# Patient Record
Sex: FEMALE | Race: WHITE
[De-identification: names, ages, dates, MRNs, and addresses within clinical notes are randomized per-mention and may not be internally consistent; named-entity substitution may affect disease eponyms.]

---

## 2017-12-31 ENCOUNTER — HOSPITAL ENCOUNTER (EMERGENCY)
Dept: HOSPITAL 25 - UCCORT | Age: 57
Discharge: HOME | End: 2017-12-31
Payer: COMMERCIAL

## 2017-12-31 VITALS — DIASTOLIC BLOOD PRESSURE: 95 MMHG | SYSTOLIC BLOOD PRESSURE: 152 MMHG

## 2017-12-31 DIAGNOSIS — R30.0: Primary | ICD-10-CM

## 2017-12-31 DIAGNOSIS — Z88.2: ICD-10-CM

## 2017-12-31 DIAGNOSIS — I10: ICD-10-CM

## 2017-12-31 DIAGNOSIS — Z95.5: ICD-10-CM

## 2017-12-31 DIAGNOSIS — R82.99: ICD-10-CM

## 2017-12-31 DIAGNOSIS — Z88.6: ICD-10-CM

## 2017-12-31 PROCEDURE — G0463 HOSPITAL OUTPT CLINIC VISIT: HCPCS

## 2017-12-31 PROCEDURE — 87077 CULTURE AEROBIC IDENTIFY: CPT

## 2017-12-31 PROCEDURE — 87086 URINE CULTURE/COLONY COUNT: CPT

## 2017-12-31 PROCEDURE — 99211 OFF/OP EST MAY X REQ PHY/QHP: CPT

## 2017-12-31 PROCEDURE — 81003 URINALYSIS AUTO W/O SCOPE: CPT

## 2017-12-31 PROCEDURE — 87186 SC STD MICRODIL/AGAR DIL: CPT

## 2017-12-31 NOTE — UC
Complaint Female HPI





- HPI Summary


HPI Summary: 


Pain and burning with urination began Friday night (2 days ago) tried Vitamin C 

and Herbal Meds Increase fluids, sx are continuing---no chills fever nausea 

vomiting or back pain








- History Of Current Complaint


Chief Complaint: UCGU


Stated Complaint: URINARY


Time Seen by Provider: 12/31/17 12:29


Hx Obtained From: Patient


Hx Last Menstrual Period: 11/27/14


Pregnant?: No


Onset/Duration: Sudden Onset, Lasting Days - 2, Still Present


Timing: Constant


Severity Initially: Moderate


Severity Currently: Moderate


Character: Burning


Aggravating Factor(s): Urination


Alleviating Factor(s): Nothing


Associated Signs And Symptoms: Positive: Negative





- Allergies/Home Medications


Allergies/Adverse Reactions: 


 Allergies











Allergy/AdvReac Type Severity Reaction Status Date / Time


 


Sulfa Drugs Allergy Severe Hives Verified 12/31/17 12:39


 


Diphenhydramine Allergy  Anaphylatic Verified 12/31/17 12:39





[From Benadryl]   Shock  














PMH/Surg Hx/FS Hx/Imm Hx


Previously Healthy: No


Endocrine History: Dyslipidemia


Cardiovascular History: Cardiac Disease, Hypertension





- Surgical History


Surgical History: Yes


Surgery Procedure, Year, and Place: 4 stents.  2 caths.  CAD, HTN





- Family History


Known Family History: Positive: None





- Social History


Occupation: Employed Full-time


Lives: With Family


Alcohol Use: Daily


Substance Use Type: None


Smoking Status (MU): Never Smoked Tobacco





Review of Systems


Constitutional: Negative


Skin: Negative


Eyes: Negative


ENT: Negative


Respiratory: Negative


Cardiovascular: Negative


Gastrointestinal: Negative


Genitourinary: Dysuria, Frequency, Urgency


Motor: Negative


Neurovascular: Negative


Musculoskeletal: Negative


Neurological: Negative


Psychological: Negative


Is Patient Immunocompromised?: No


All Other Systems Reviewed And Are Negative: Yes





Physical Exam


Triage Information Reviewed: Yes


Appearance: Well-Appearing, No Pain Distress, Well-Nourished


Vital Signs Reviewed: Yes


Eye Exam: Normal


Eyes: Positive: Conjunctiva Clear


ENT Exam: Normal


ENT: Positive: Normal ENT inspection, Hearing grossly normal, Pharynx normal.  

Negative: Nasal congestion, Nasal drainage, Trismus, Muffled voice, Hoarse voice

, Dental tenderness, Sinus tenderness, Uvula midline


Dental Exam: Normal


Neck exam: Normal


Neck: Positive: Supple, Nontender, No Lymphadenopathy


Respiratory Exam: Normal


Respiratory: Positive: Chest non-tender, Lungs clear, Normal breath sounds, No 

respiratory distress, No accessory muscle use


Cardiovascular Exam: Normal


Cardiovascular: Positive: RRR, No Murmur, Pulses Normal, Brisk Capillary Refill


Abdominal Exam: Normal


Abdomen Description: Positive: Nontender, No Organomegaly, Soft.  Negative: CVA 

Tenderness (R), CVA Tenderness (L), Hepatomegaly, McBurney's Point Tenderness


Bowel Sounds: Positive: Present


Musculoskeletal Exam: Normal


Musculoskeletal: Positive: Strength Intact, ROM Intact, No Edema


Neurological Exam: Normal


Neurological: Positive: Alert, Muscle Tone Normal


Psychological Exam: Normal


Psychological: Positive: Normal Response To Family


Skin Exam: Normal





Diagnostics





- Laboratory


Diagnostic Studies Completed/Ordered: UA-Trace Leukoesterace





 Complaint Female Dx





- Course


Course Of Treatment: culture urine increase fluids, azo for dysuria follow BP 

with pcp will call if urine culture is positive





- Differential Dx/Diagnosis


Provider Diagnoses: Dysuria, Hypertension in poor control





Discharge





- Discharge Plan


Condition: Stable


Disposition: HOME


Patient Education Materials:  Dysuria (ED), Hypertension (ED)


Referrals: 


Leigh Ricks PA [Primary Care Provider] - 1 Week

## 2018-01-04 NOTE — UC
- Progress Note


Progress Note: 





(+) culture with UTI Symptoms -- mildly positive small amount of colony count -

- if still with any UTI Sx then can start cephalexin 250 mg PO BID for  7 days 

and if asymptomatic then continue to push fluids and recheck with PCP in 2 

weeks 





Course/Dx





- Course


Course Of Treatment: culture urine increase fluids, azo for dysuria follow BP 

with pcp will call if urine culture is positive

## 2018-12-07 ENCOUNTER — HOSPITAL ENCOUNTER (EMERGENCY)
Dept: HOSPITAL 25 - UCCORT | Age: 58
Discharge: HOME | End: 2018-12-07
Payer: COMMERCIAL

## 2018-12-07 VITALS — DIASTOLIC BLOOD PRESSURE: 76 MMHG | SYSTOLIC BLOOD PRESSURE: 135 MMHG

## 2018-12-07 DIAGNOSIS — Z88.8: ICD-10-CM

## 2018-12-07 DIAGNOSIS — I10: ICD-10-CM

## 2018-12-07 DIAGNOSIS — J32.9: Primary | ICD-10-CM

## 2018-12-07 DIAGNOSIS — Z88.2: ICD-10-CM

## 2018-12-07 PROCEDURE — G0463 HOSPITAL OUTPT CLINIC VISIT: HCPCS

## 2018-12-07 PROCEDURE — 99212 OFFICE O/P EST SF 10 MIN: CPT

## 2018-12-07 NOTE — UC
Throat Pain/Nasal Zhang HPI





- HPI Summary


HPI Summary: 





Pt presents with c/o cough, nasal congestion, sinus pressure and pain X 3 

weeks. 





- History of Current Complaint


Chief Complaint: UCGeneralIllness


Stated Complaint: COUGH/SINUS COMPLIANT


Time Seen by Provider: 12/07/18 10:42


Hx Obtained From: Patient


Hx Last Menstrual Period: 11/27/14


Pregnant?: No


Onset/Duration: Gradual Onset, Lasting Weeks, Still Present


Severity: Moderate


Pain Intensity: 0


Cough: Nonproductive


Associated Signs & Symptoms: Positive: Hoarseness, Sinus Discomfort





- Epiglottits Risk Factors


Epiglottis Risk Factors: Negative





- Allergies/Home Medications


Allergies/Adverse Reactions: 


 Allergies











Allergy/AdvReac Type Severity Reaction Status Date / Time


 


diphenhydramine Allergy Severe Anaphylatic Verified 12/07/18 10:45





   Shock  


 


Sulfa (Sulfonamide Allergy Severe Hives Verified 12/07/18 10:45





Antibiotics)     














PMH/Surg Hx/FS Hx/Imm Hx


Previously Healthy: No


Cardiovascular History: Cardiac Disease, Hypertension





- Surgical History


Surgical History: Yes


Surgery Procedure, Year, and Place: 4 stents.  2 caths.  CAD, HTN





- Family History


Known Family History: Positive: Cardiac Disease





- Social History


Occupation: Employed Full-time


Lives: With Family


Alcohol Use: Daily


Substance Use Type: None


Smoking Status (MU): Never Smoked Tobacco


Have You Smoked in the Last Year: No





- Immunization History


Most Recent Influenza Vaccination: CURRENT FOR 2017/2018


Vaccination Up to Date: Yes





Review of Systems


All Other Systems Reviewed And Are Negative: Yes


Constitutional: Positive: Fatigue


Skin: Positive: Negative


Eyes: Positive: Negative


ENT: Positive: Sore Throat, Sinus Congestion, Sinus Pain/Tenderness


Respiratory: Positive: Cough


Cardiovascular: Positive: Negative


Gastrointestinal: Positive: Negative


Genitourinary: Positive: Negative


Motor: Positive: Negative


Neurovascular: Positive: Negative


Musculoskeletal: Positive: Negative


Neurological: Positive: Negative


Psychological: Positive: Negative


Is Patient Immunocompromised?: No





Physical Exam


Triage Information Reviewed: Yes


Appearance: Ill-Appearing


Vital Signs: 


 Initial Vital Signs











Temp  98.6 F   12/07/18 10:41


 


Pulse  96   12/07/18 10:41


 


Resp  14   12/07/18 10:41


 


BP  135/76   12/07/18 10:41


 


Pulse Ox  100   12/07/18 10:41











Vital Signs Reviewed: Yes


Eye Exam: Normal


ENT: Positive: Nasal congestion, Sinus tenderness


Dental Exam: Normal


Neck exam: Normal


Respiratory Exam: Normal


Cardiovascular Exam: Normal


Musculoskeletal Exam: Normal


Neurological Exam: Normal


Psychological Exam: Normal


Skin Exam: Normal





Throat Pain/Nasal Course/Dx





- Differential Dx/Diagnosis


Differential Diagnosis/HQI/PQRI: Influenza, Sinusitis, URI


Provider Diagnosis: 


 Sinusitis








Discharge





- Sign-Out/Discharge


Documenting (check all that apply): Patient Departure


All imaging exams completed and their final reports reviewed: No Studies





- Discharge Plan


Condition: Stable


Disposition: HOME


Prescriptions: 


Amoxicillin PO (*) [Amoxicillin 875 MG (*)] 875 mg PO Q12H #20 tab


predniSONE TAB* [Deltasone 20 MG TAB*] 20 mg PO DAILY #4 tab


Patient Education Materials:  Sinusitis (ED)


Referrals: 


Leigh Ricks PA [Primary Care Provider] - If Needed





- Billing Disposition and Condition


Condition: STABLE


Disposition: Home

## 2019-08-07 ENCOUNTER — HOSPITAL ENCOUNTER (EMERGENCY)
Dept: HOSPITAL 25 - UCCORT | Age: 59
Discharge: HOME | End: 2019-08-07
Payer: COMMERCIAL

## 2019-08-07 VITALS — SYSTOLIC BLOOD PRESSURE: 132 MMHG | DIASTOLIC BLOOD PRESSURE: 69 MMHG

## 2019-08-07 DIAGNOSIS — Z88.2: ICD-10-CM

## 2019-08-07 DIAGNOSIS — H60.91: Primary | ICD-10-CM

## 2019-08-07 DIAGNOSIS — I10: ICD-10-CM

## 2019-08-07 DIAGNOSIS — H66.91: ICD-10-CM

## 2019-08-07 PROCEDURE — G0463 HOSPITAL OUTPT CLINIC VISIT: HCPCS

## 2019-08-07 PROCEDURE — 99212 OFFICE O/P EST SF 10 MIN: CPT

## 2019-08-07 NOTE — UC
Ear Complaint HPI





- HPI Summary


HPI Summary: 


58-year-old woman comes in with a chief complaint of right ear pain.  Started 

several days ago after getting water in her ear.  She started on neomycin HC 

ear drops and that is not helping.  Touching the ear increases the pain.  

Patient has postnasal drip and rhinorrhea and sore throat right side.  No 

recent fevers.





- History of Current Complaint


Chief Complaint: UCEar


Stated Complaint: R EAR ACHE


Time Seen by Provider: 08/07/19 09:27


Hx Last Menstrual Period: 11/27/14


Pain Intensity: 7





- Allergies/Home Medications


Allergies/Adverse Reactions: 


 Allergies











Allergy/AdvReac Type Severity Reaction Status Date / Time


 


diphenhydramine Allergy Severe Anaphylatic Verified 08/07/19 09:24





   Shock  


 


Sulfa (Sulfonamide Allergy Severe Hives Verified 08/07/19 09:24





Antibiotics)     


 


hydrochlorothiazide Allergy  Hives Verified 08/07/19 09:24











Home Medications: 


 Home Medications





Clopidogrel TAB* [Plavix TAB*] 75 mg PO DAILY 08/07/19 [History Confirmed 08/07/ 19]


Ezetimibe TAB* [Zetia TAB*] 10 mg PO DAILY 08/07/19 [History Confirmed 08/07/19]


Losartan TAB* [Cozaar TAB*] 25 mg PO DAILY 08/07/19 [History Confirmed 08/07/19]


Nebivolol TAB (NF) [Bystolic TAB (NF)] 2.5 mg PO DAILY 08/07/19 [History 

Confirmed 08/07/19]


Neomyc/Polym/HC 1% OTIC SUSP* [Cortisporin Otic Susp 1%*] 4 drop RIGHT EAR TID 

08/07/19 [History Confirmed 08/07/19]


Rabeprazole (NF) [Aciphex (NF)] 20 mg PO DAILY 08/07/19 [History Confirmed 08/07 /19]


Rosuvastatin Calcium [Crestor] 40 mg PO DAILY 08/07/19 [History Confirmed 08/07/ 19]


amLODIPine TAB* [Norvasc 5 mg TAB*] 2.5 mg PO DAILY 08/07/19 [History Confirmed 

08/07/19]











PMH/Surg Hx/FS Hx/Imm Hx


Previously Healthy: Yes


Cardiovascular History: Hypertension





- Surgical History


Surgical History: None


Surgery Procedure, Year, and Place: Cardiac Caths and Stents





- Family History


Known Family History: Positive: None, Cardiac Disease





- Social History


Alcohol Use: Daily


Substance Use Type: None


Smoking Status (MU): Never Smoked Tobacco


Have You Smoked in the Last Year: No





- Immunization History


Most Recent Influenza Vaccination: CURRENT FOR 2017/2018


Vaccination Up to Date: Yes





Review of Systems


All Other Systems Reviewed And Are Negative: Yes


Constitutional: Positive: Negative


Skin: Positive: Negative


Eyes: Positive: Negative


ENT: Positive: Sore Throat, Ear Ache, Nasal Discharge


Respiratory: Positive: Negative


Cardiovascular: Positive: Negative


Gastrointestinal: Positive: Negative


Motor: Positive: Negative


Neurovascular: Positive: Negative


Musculoskeletal: Positive: Negative


Neurological: Positive: Negative


Psychological: Positive: Negative


Is Patient Immunocompromised?: No





Physical Exam


Triage Information Reviewed: Yes


Appearance: Well-Appearing, Well-Nourished, Pain Distress - MILD WITH RT EAR 

EXAM


Vital Signs: 


 Initial Vital Signs











Temp  98.7 F   08/07/19 09:21


 


Pulse  87   08/07/19 09:21


 


Resp  15   08/07/19 09:21


 


BP  132/69   08/07/19 09:21


 


Pulse Ox  100   08/07/19 09:21











Vital Signs Reviewed: Yes


Eye Exam: Normal


ENT: Positive: Pharyngeal erythema, TM dull - R CANDY, Uvula midline


Neck: Positive: Supple


Respiratory: Positive: Lungs clear, Normal breath sounds, No respiratory 

distress


Cardiovascular: Positive: RRR


Musculoskeletal: Positive: Strength Intact, ROM Intact


Neurological: Positive: Alert


Psychological: Positive: Age Appropriate Behavior


Skin Exam: Normal





Ear Complaint Course/Dx





- Course


Course Of Treatment: 


Patient.  The neomycin HC drops and switch over to ofloxacin drops.  Because of 

the spreading of the pain and infection we discussed the use of oral 

antibiotics in the patient reports that when she has a sinus infection the Ace 

azithromycin Z-Fantasma always works.  She tells me Augmentin does not work for her.

  She prefers to be on the Ace azithromycin at this time.  I recommended follow-

up with ENT if not improved or worsening.





- Differential Dx/Diagnosis


Provider Diagnosis: 


 Right otitis externa, Right serous otitis media








Discharge





- Sign-Out/Discharge


Documenting (check all that apply): Patient Departure


All imaging exams completed and their final reports reviewed: No Studies





- Discharge Plan


Condition: Stable


Disposition: HOME


Prescriptions: 


Azithromyxin FANTASMA (NF) [Z-Fantasma (Zithromax) 250 mg tabs #6] 2 tab PO .TODAY, THEN 

1 DAILY #6 tab


Ofloxacin 0.3% (Ear Drop)* [Floxin 0.3% OTIC.SOL (Ear Drop)] 5 drop RIGHT EAR 

BID #1 btl


Patient Education Materials:  Otitis Externa (ED), Serous Otitis Media (ED)


Referrals: 


Leigh Ricks PA [Primary Care Provider] - 


Adam Nair MD [Medical Doctor] - 


Additional Instructions: 


FOLLOW UP WITH DR NAIR, ENT, IF NOT COMPLETELY IMPROVED.


GET REEVALUATED SOONER IF WORSE OR ANY QUESTIONS OR CONCERNS.








- Billing Disposition and Condition


Condition: STABLE


Disposition: Home

## 2019-08-07 NOTE — XMS REPORT
Continuity of Care Document (CCD)

 Created on:2019



Patient:Jackeline Hopkins

Sex:Female

:1960

External Reference #:[id number]





Demographics







 Address  [address] [place] [address]

 

 Mobile Phone  [phone]

 

 Preferred Language  en

 

 Marital Status  Not  or 

 

 Judaism Affiliation  Unknown

 

 Race  White

 

 Ethnic Group  Not  or 









Author







 Name  GRETCHEN Watters

 

 Address  [address] [place] [id number]









Support







 Name  Relationship  Address  Phone

 

 Donta Hopkins    Unavailable  [phone]









Care Team Providers







 Name  Role  Phone

 

 PCP Out Of Area  Primary Care Physician  Unavailable









Payers







 Date  Identification Numbers  Payment Provider  Subscriber

 

   Policy Number: [id number]  [employer]VI[employer]  Jackeline Hopkins









 PayID: [id number]  [address] [id number]









 [place] [address]







Family History







 Date  Family Member(s)  Observation  Comments

 

   General  Heart Disease  parents







Social History







 Type  Date  Description  Comments

 

 Birth Sex    Unknown  

 

 ETOH Use    Occasionally consumes alcohol  

 

 Tobacco Use  Start: Unknown  Patient has never smoked  

 

 Smoking Status  Reviewed: 19  Patient has never smoked  







Allergies, Adverse Reactions, Alerts







 Active Allergies  Reaction  Severity  Comments  Date

 

 Sulfa        2019

 

 Hydrochlorothiazide        2019

 

 Benadryl        2019







Medications







 Active Medications  SIG  Qnty  Indications  Ordering Provider  Date

 

 Neomycin/Polymyxin/Hyd  4 drops right ear  10ml  H60.391  Henry Chowdary  
2019



 rocortisone (Otic)  three times daily      ELENA MARTINO  



   x 7 days        



 3.5-39625-2 Solution          



           

 

 Bystolic        Unknown  



       2.5mg Tablets          



           

 

 Zetia        Unknown  



    10mg Tablets          



           

 

 Plavix  1 by mouth every      Unknown  



     75mg Tablets  day        



           

 

 Amlodipine Besylate  1 by mouth every      Unknown  



                  2.5mg  day        



 Tablets          



           

 

 Omeprazole        Unknown  



         20mg Capsules          



 DR          

 

 Losartan Potassium  1 tablet by mouth      Unknown  



                 25mg  every daily        



 Tablets          



           

 

 Aspirin Adult        Unknown  



            325mg          



 Tablets          



           

 

 Zyrtec Allergy  1 by mouth every      Unknown  



             10mg  day        



 Capsules          



           







Vital Signs







 Date  Vital  Result  Comment

 

 2019 10:37am  BP Systolic  135 mmHg  









 BP Diastolic  78 mmHg  

 

 Heart Rate  83 /min  

 

 Respiratory Rate  16 /min  

 

 O2 % BldC Oximetry  99 %  

 

 Body Temperature  98.7 F  

 

 Weight  172.00 lb  

 

 Height  69 inches  5'9"

 

 BMI (Body Mass Index)  25.4 kg/m2  

 

 Pain Level  5  







Encounters







 Type  Date  Location  Provider  Dx  Diagnosis

 

 Office Visit  2019  Main Office  Roshan Yañez,  H60.391  Other 
infective



   10:15a    P.A.    otitis externa,



           right ear









 R01.1  Cardiac murmur, unspecified







Plan of Treatment

2019 - Roshan Yañez, P.A.H60.391 Other infective otitis externa, 
right earNew Medication:Neomycin/Polymyxin/Hydrocortisone (Otic) 3.5-03633-9  - 
4 drops right ear three times daily x 7 daysComments:Supportive careTylenol or 
Ibuprofen as needed for painKeep ear dry for 7 daysAvoid q-tips Instructedon 
proper use of ear drops. Follow with PCP or return as needed for increasing or 
persisting symptoms.R01.1 Cardiac murmur, unspecifiedComments:patient 
awarestates it has been present since childhood

## 2019-12-08 ENCOUNTER — HOSPITAL ENCOUNTER (EMERGENCY)
Dept: HOSPITAL 25 - UCCORT | Age: 59
Discharge: HOME | End: 2019-12-08
Payer: COMMERCIAL

## 2019-12-08 VITALS — DIASTOLIC BLOOD PRESSURE: 71 MMHG | SYSTOLIC BLOOD PRESSURE: 130 MMHG

## 2019-12-08 DIAGNOSIS — R53.83: ICD-10-CM

## 2019-12-08 DIAGNOSIS — Z88.8: ICD-10-CM

## 2019-12-08 DIAGNOSIS — Z95.5: ICD-10-CM

## 2019-12-08 DIAGNOSIS — J02.9: ICD-10-CM

## 2019-12-08 DIAGNOSIS — J32.9: Primary | ICD-10-CM

## 2019-12-08 DIAGNOSIS — I10: ICD-10-CM

## 2019-12-08 DIAGNOSIS — Z88.2: ICD-10-CM

## 2019-12-08 PROCEDURE — G0463 HOSPITAL OUTPT CLINIC VISIT: HCPCS

## 2019-12-08 PROCEDURE — 99212 OFFICE O/P EST SF 10 MIN: CPT

## 2019-12-08 NOTE — UC
Throat Pain/Nasal Zhang HPI





- HPI Summary


HPI Summary: 





Pt presents with c/o sinus congestion, pressure and pain X 10 days. Pt has hx 

of sinusitis and thinks she has one now.  





- History of Current Complaint


Chief Complaint: UCRespiratory


Stated Complaint: SINUS


Time Seen by Provider: 12/08/19 10:28


Hx Obtained From: Patient


Hx Last Menstrual Period: 11/27/14


Pregnant?: No


Onset/Duration: Gradual Onset, Lasting Days, Still Present, Worse Since - onset


Severity: Moderate


Pain Intensity: 4


Cough: Nonproductive


Associated Signs & Symptoms: Positive: Hoarseness, Sinus Discomfort, Nasal 

Discharge





- Epiglottits Risk Factors


Epiglottis Risk Factors: Negative





- Allergies/Home Medications


Allergies/Adverse Reactions: 


 Allergies











Allergy/AdvReac Type Severity Reaction Status Date / Time


 


diphenhydramine Allergy Severe Anaphylatic Verified 12/08/19 10:35





   Shock  


 


Sulfa (Sulfonamide Allergy Severe Hives Verified 12/08/19 10:35





Antibiotics)     


 


hydrochlorothiazide Allergy  Hives Verified 12/08/19 10:35














PMH/Surg Hx/FS Hx/Imm Hx


Previously Healthy: Yes


Cardiovascular History: Cardiac Disease, Hypertension





- Surgical History


Surgical History: Yes


Surgery Procedure, Year, and Place: Cardiac Caths and Stents





- Family History


Known Family History: Positive: None, Cardiac Disease





- Social History


Occupation: Employed Full-time


Lives: With Family


Alcohol Use: Daily


Alcohol Amount: 1 glass of wine/day


Substance Use Type: None


Smoking Status (MU): Never Smoked Tobacco


Have You Smoked in the Last Year: No





- Immunization History


Most Recent Influenza Vaccination: CURRENT FOR 2017/2018


Vaccination Up to Date: Yes





Review of Systems


All Other Systems Reviewed And Are Negative: Yes


Constitutional: Positive: Chills, Fatigue


Skin: Positive: Negative


Eyes: Positive: Negative


ENT: Positive: Sore Throat, Nasal Discharge, Sinus Congestion, Sinus Pain/

Tenderness


Respiratory: Positive: Cough


Cardiovascular: Positive: Negative


Gastrointestinal: Positive: Negative


Genitourinary: Positive: Negative


Motor: Positive: Negative


Neurovascular: Positive: Negative


Musculoskeletal: Positive: Negative


Neurological: Positive: Negative


Psychological: Positive: Negative


Is Patient Immunocompromised?: No





Physical Exam


Triage Information Reviewed: Yes


Appearance: Ill-Appearing


Vital Signs: 


 Initial Vital Signs











Temp  98.9 F   12/08/19 10:36


 


Pulse  81   12/08/19 10:36


 


Resp  16   12/08/19 10:36


 


BP  130/71   12/08/19 10:36


 


Pulse Ox  100   12/08/19 10:36











Vital Signs Reviewed: Yes


Eye Exam: Normal


ENT: Positive: Nasal congestion, Hoarse voice, Sinus tenderness


Dental Exam: Normal


Neck exam: Normal


Respiratory Exam: Normal


Cardiovascular Exam: Normal


Musculoskeletal Exam: Normal


Neurological Exam: Normal


Psychological Exam: Normal


Skin Exam: Normal





Throat Pain/Nasal Course/Dx





- Course


Course Of Treatment: 





Pt requested zithromax stating that amox and augmentin  "do not work for her"





- Differential Dx/Diagnosis


Differential Diagnosis/HQI/PQRI: Sinusitis, URI


Provider Diagnosis: 


 Sinusitis








Discharge ED





- Sign-Out/Discharge


Documenting (check all that apply): Patient Departure


All imaging exams completed and their final reports reviewed: No Studies





- Discharge Plan


Condition: Stable


Disposition: HOME


Prescriptions: 


Azithromycin TAB* [Zithromax TAB (Z-ABIOLA) 250 mg #6 tabs] 2 tab PO .TODAY, THEN 

1 DAILY #1 abiola


Patient Education Materials:  Sinusitis (ED)


Referrals: 


Leigh Ricks PA [Primary Care Provider] - If Needed





- Billing Disposition and Condition


Condition: STABLE


Disposition: Home